# Patient Record
Sex: MALE | Race: ASIAN | Employment: FULL TIME | URBAN - METROPOLITAN AREA
[De-identification: names, ages, dates, MRNs, and addresses within clinical notes are randomized per-mention and may not be internally consistent; named-entity substitution may affect disease eponyms.]

---

## 2018-06-12 ENCOUNTER — OFFICE VISIT (OUTPATIENT)
Dept: INTERNAL MEDICINE | Facility: CLINIC | Age: 23
End: 2018-06-12
Payer: COMMERCIAL

## 2018-06-12 VITALS
BODY MASS INDEX: 24.79 KG/M2 | WEIGHT: 148.8 LBS | RESPIRATION RATE: 20 BRPM | OXYGEN SATURATION: 97 % | TEMPERATURE: 98 F | HEART RATE: 69 BPM | SYSTOLIC BLOOD PRESSURE: 138 MMHG | DIASTOLIC BLOOD PRESSURE: 95 MMHG | HEIGHT: 65 IN

## 2018-06-12 DIAGNOSIS — I10 HYPERTENSION, UNSPECIFIED TYPE: Primary | ICD-10-CM

## 2018-06-12 DIAGNOSIS — M54.50 ACUTE MIDLINE LOW BACK PAIN WITHOUT SCIATICA: ICD-10-CM

## 2018-06-12 ASSESSMENT — ENCOUNTER SYMPTOMS
TACHYCARDIA: 0
SKIN CHANGES: 0
HOARSE VOICE: 0
SYNCOPE: 0
NIGHT SWEATS: 0
MEMORY LOSS: 0
DYSURIA: 0
RESPIRATORY PAIN: 0
HYPOTENSION: 0
BACK PAIN: 1
ARTHRALGIAS: 0
ALTERED TEMPERATURE REGULATION: 0
TREMORS: 0
HEMOPTYSIS: 0
ABDOMINAL PAIN: 0
SORE THROAT: 0
PARALYSIS: 0
DECREASED CONCENTRATION: 0
NECK MASS: 0
DEPRESSION: 0
DIARRHEA: 0
SPEECH CHANGE: 0
SINUS PAIN: 0
LEG SWELLING: 0
DIFFICULTY URINATING: 0
TASTE DISTURBANCE: 0
BLOOD IN STOOL: 0
MUSCLE WEAKNESS: 0
HEMATURIA: 0
MUSCLE CRAMPS: 0
SINUS CONGESTION: 0
POLYDIPSIA: 0
RECTAL BLEEDING: 0
LOSS OF CONSCIOUSNESS: 0
INCREASED ENERGY: 0
WEAKNESS: 0
SHORTNESS OF BREATH: 0
SLEEP DISTURBANCES DUE TO BREATHING: 0
HYPERTENSION: 0
WEIGHT GAIN: 0
DECREASED APPETITE: 0
EYE REDNESS: 0
TROUBLE SWALLOWING: 0
DISTURBANCES IN COORDINATION: 0
SEIZURES: 0
ORTHOPNEA: 0
DOUBLE VISION: 0
BLOATING: 0
NAUSEA: 0
STIFFNESS: 0
SNORES LOUDLY: 0
POSTURAL DYSPNEA: 0
EXTREMITY NUMBNESS: 0
SPUTUM PRODUCTION: 0
NAIL CHANGES: 0
DYSPNEA ON EXERTION: 0
HEARTBURN: 0
SMELL DISTURBANCE: 0
FLANK PAIN: 0
BRUISES/BLEEDS EASILY: 0
COUGH: 0
SWOLLEN GLANDS: 0
WEIGHT LOSS: 0
HEADACHES: 0
INSOMNIA: 0
LEG PAIN: 0
PALPITATIONS: 0
NERVOUS/ANXIOUS: 0
DIZZINESS: 0
VOMITING: 0
CONSTIPATION: 0
CLAUDICATION: 0
COUGH DISTURBING SLEEP: 0
CHILLS: 0
RECTAL PAIN: 0
EXERCISE INTOLERANCE: 0
NUMBNESS: 0
TINGLING: 0
MYALGIAS: 0
NECK PAIN: 0
EYE WATERING: 0
POLYPHAGIA: 0
FATIGUE: 0
JAUNDICE: 0
WHEEZING: 0
HALLUCINATIONS: 0
EYE IRRITATION: 0
POOR WOUND HEALING: 0
EYE PAIN: 0
BOWEL INCONTINENCE: 0
FEVER: 0
LIGHT-HEADEDNESS: 0
JOINT SWELLING: 0
PANIC: 0

## 2018-06-12 ASSESSMENT — PAIN SCALES - GENERAL: PAINLEVEL: NO PAIN (0)

## 2018-06-12 NOTE — NURSING NOTE
Chief Complaint   Patient presents with     Establish Care     Patient is here to establish care for PCP     Back Pain     Also here for low back pain       Brandon Jules CMA ([facility]) at 9:46 AM on 6/12/2018

## 2018-06-12 NOTE — LETTER
[employer] Customer Service  [facility]  [address], [address]  [place] [address]  Fax: [phone]  Phone: [phone]      2018      Juan A Ro  [address]  [address] [place] [id number]        Dear Juan A,    Thank you for your interest in becoming a [employer] user!    Your access code is: [email or web address]  Expires: 2018 12:49 PM     Please access the [employer] website at [email or web address].  Below the ID and password fields, select the  Sign Up Now  as New User.  You will be prompted to enter the access code listed above as well as additional personal information.  Please follow the directions carefully when creating your username and password.    If you allow your access code to , or if you have any questions please call a [employer] Representative at [phone] during normal clinic hours.     Sincerely,      [employer] Customer Service  [facility]

## 2018-06-12 NOTE — PROGRESS NOTES
PRIMARY CARE CENTER         HPI:       Juan A Ro is a 22 year old male who presents for the following  Patient presents with: Establish Care (Patient is here to establish care for PCP) and Back Pain (Also here for low back pain)    Low back pain: Has had it for a couple of weeks. Thinks it may be due to weight lifting. He has not gotten a new bed. Recently got back into weight lifting a couple of months ago, and has been progressing pretty quickly.     Back only hurts in the morning or the middle of the night. And it dissipates once he gets up and walks around and stretches. It does linger for an hour. It causes him to wake up earlier and makes him more restless. His back hurts right in the middle. The pain has stayed the same, some days it hurts a bit more. Besides the stretching has not tried anything. Has not tried any over the counter pain meds. Uses a foam roller and stretch exercises sometimes in the morning and sometimes before weight lifting and sometimes after weight lifting. He has stopped squatting and deadlifting for a little over a week. The pain has stopped a little today but prior to this has been the same. He was deadlifting 250lb and was squatting about 185lb.     No fevers or chills, no numbness or tingling in legs or groin area, no problems with urinating or having bowel movemets, and he has not tripped more than usual. No recent trauma.    PMHx:  No chronic medical conditions.     PSHx:  No past sugeries    FamHx:  No family hx of autoimmune conditions. No fam hx of ankylosing spondylitis.     Allergies:  NKDA.    Meds:  No meds.     SocHx:  EtOH: none  Tob: none  Ellicit: none  He is from [place], he has been in [place] for four years. [employer] doing a masters.     Problem, Medication and Allergy Lists were reviewed and are current.  Patient is a new patient to this clinic and so  I reviewed/updated the Past Medical History, the Family History and the Social History.          Review  of Systems:   Review of Systems     Constitutional:  Negative for fever, chills, weight loss, weight gain, fatigue, decreased appetite, night sweats, recent stressors, height gain, height loss, post-operative complications, incisional pain, hallucinations, increased energy, hyperactivity and confused.   HENT:  Negative for ear pain, hearing loss, tinnitus, nosebleeds, trouble swallowing, hoarse voice, mouth sores, sore throat, ear discharge, tooth pain, gum tenderness, taste disturbance, smell disturbance, hearing aid, bleeding gums, dry mouth, sinus pain, sinus congestion and neck mass.    Eyes:  Negative for double vision, pain, redness, eye pain, decreased vision, eye watering, eye bulging, eye dryness, flashing lights, spots, floaters, strabismus, tunnel vision, jaundice and eye irritation.   Respiratory:   Negative for cough, hemoptysis, sputum production, shortness of breath, wheezing, sleep disturbances due to breathing, snores loudly, respiratory pain, dyspnea on exertion, cough disturbing sleep and postural dyspnea.    Cardiovascular:  Negative for chest pain, dyspnea on exertion, palpitations, orthopnea, claudication, leg swelling, fingers/toes turn blue, hypertension, hypotension, syncope, history of heart murmur, chest pain on exertion, chest pain at rest, pacemaker, few scattered varicosities, leg pain, sleep disturbances due to breathing, tachycardia, light-headedness, exercise intolerance and edema.   Gastrointestinal:  Negative for heartburn, nausea, vomiting, abdominal pain, diarrhea, constipation, blood in stool, melena, rectal pain, bloating, hemorrhoids, bowel incontinence, jaundice, rectal bleeding, coffee ground emesis and change in stool.   Genitourinary:  Negative for bladder incontinence, dysuria, urgency, hematuria, flank pain, difficulty urinating, nocturia, voiding less frequently, scrotal pain, ulcerations, penile discharge, male genitourinary complaint and reduced libido.  "  Musculoskeletal:  Positive for back pain. Negative for myalgias, joint swelling, arthralgias, stiffness, muscle cramps, neck pain, bone pain, muscle weakness and fracture.   Skin:  Negative for nail changes, itching, poor wound healing, rash, hair changes, skin changes, acne, warts, poor wound healing, scarring, flaky skin, Raynaud's phenomenon, sensitivity to sunlight and skin thickening.   Neurological:  Negative for dizziness, tingling, tremors, speech change, seizures, loss of consciousness, weakness, light-headedness, numbness, headaches, disturbances in coordination, extremity numbness, memory loss, difficulty walking and paralysis.   Endo/Heme:  Negative for anemia, swollen glands and bruises/bleeds easily.   Psychiatric/Behavioral:  Negative for depression, hallucinations, memory loss, decreased concentration, mood swings and panic attacks.    Endocrine:  Negative for altered temperature regulation, polyphagia, polydipsia, unwanted hair growth and change in facial hair.    I have personally reviewed and updated the complete ROS on the day of the visit.           Physical Exam:   BP (!) 138/95 (BP Location: Right arm, Patient Position: Chair, Cuff Size: Adult Regular)  Pulse 69  Temp 98  F (36.7  C) (Oral)  Resp 20  Ht 1.657 m (5' 5.25\")  Wt 67.5 kg (148 lb 12.8 oz)  SpO2 97%  BMI 24.57 kg/m2  Body mass index is 24.57 kg/(m^2).  Vitals were reviewed       GENERAL APPEARANCE: healthy, alert and no distress     EYES: EOMI,  PERRL     HENT: mouth without ulcers or lesions     RESP: lungs clear to auscultation - no rales, rhonchi or wheezes     CV: regular rates and rhythm, normal S1 S2, no S3 or S4 and no murmur, click or rub     ABDOMEN:  soft, nontender, no HSM or masses and bowel sounds normal     MS: extremities normal- no gross deformities noted, no evidence of inflammation in joints, FROM in all extremities.     SKIN: no suspicious lesions or rashes     NEURO: Normal strength and tone, sensory " exam grossly normal, mentation intact and speech normal     BACK: No spine tenderness or step-offs palpated, no paraspinal muscle tenderness; no pain on palpation of the posterior iliac spine      PSYCH: mentation appears normal. and affect normal/bright     LYMPHATICS: No cervical adenopathy        Results:   No recent lab results.     Assessment and Plan   Pt is a 23 y/o M w/a negative PMHx who presents to [facility] care and for acute onset low back pain.     Pt believes that back pain is due to weight lifting. He notices it mostly in the morning. Differential includes muscle strain (most likely), vs ankylosing spondylitis (less likely given negative family history, but if back pain/stiffness persist, we could consider this).     Diagnoses and all orders for this visit:    Hypertension, unspecified type  Pt had BP of 139 and has noted high blood pressures like this in the past. We will send him home with a blood pressure monitor. I also spoke to him about increasing physical activity (aerobics) and watching sodium intake in his diet. He does not take steroids. I asked him to return to care in three months for blood pressure check.   -     Blood Pressure Monitoring (BLOOD PRESSURE MONITOR/WRIST) BENY; 1 each once for 1 dose    Acute midline low back pain without sciatica  Likely 2/2 weight lifting. Referral for phys therapy for strengthening and stretching exercises.   -     PHYSICAL THERAPY REFERRAL      Options for treatment and follow-up care were reviewed with the patient. Juan A Ro engaged in the decision making process and verbalized understanding of the options discussed and agreed with the final plan.    Quynh Nicole MD  Jun 12, 2018    Pt was seen and plan of care discussed with Dr. Isbell.       Pt was seen and examined with Dr. Nicole.  I agree with her documentation as noted above.    My additional comments: none    Delmi Isbell MD

## 2018-06-12 NOTE — MR AVS SNAPSHOT
After Visit Summary   6/12/2018    Juan A Ro    MRN: [id number]           Patient Information     Date Of Birth          1995        Visit Information        Provider Department      6/12/2018 9:40 AM Quynh Nicole MD [facility]        Today's Diagnoses     Hypertension, unspecified type    -  1    Acute midline low back pain without sciatica          Care Instructions      Primary Care Center Medication Refill Request Information:  * Please contact your pharmacy regarding ANY request for medication refills.  ** [facility] Prescription Fax = [phone]  * Please allow 3 business days for routine medication refills.  * Please allow 5 business days for controlled substance medication refills.     Primary Care Center Test Result notification information:  *You will be notified with in 7-10 days of your appointment day regarding the results of your test.  If you are on MyChart you will be notified as soon as the provider has reviewed the results and signed off on them.    Primary Care Center [phone]   Physical Therapy for all [facility] locations: [phone]    Exercises to Strengthen Your Lower Back  Strong lower back and abdominal muscles work together to support your spine. The exercises below will help strengthen the lower back. It is important that you begin exercising slowly and increase levels gradually.  Always begin any exercise program with stretching. If you feel pain while doing any of these exercises, stop and talk to your doctor about a more specific exercise program that better suits your condition.   Low back stretch  The point of stretching is to make you more flexible and increase your range of motion. Stretch only as much as you are able. Stretch slowly. Do not push your stretch to the limit. If at any point you feel pain while stretching, this is your (temporary) limit.    Lie on your back with your knees bent and both feet on the ground.    Slowly  raise your left knee to your chest as you flatten your lower back against the floor. Hold for 5 seconds.    Relax and repeat the exercise with your right knee.    Do 10 of these exercises for each leg.    Repeat hugging both knees to your chest at the same time.  Building lower back strength  Start your exercise routine with 10 to 30 minutes a day, 1 to 3 times a day.  Initial exercises  Lying on your back:  1. Ankle pumps: Move your foot up and down, towards your head, and then away. Repeat 10 times with each foot.  2. Heel slides: Slowly bend your knee, drawing the heel of your foot towards you. Then slide your heel/foot from you, straightening your knee. Do not lift your foot off the floor (this is not a leg lift).  3. Abdominal contraction: Bend your knees and put your hands on your stomach. Tighten your stomach muscles. Hold for 5 seconds, then relax. Repeat 10 times.  4. Straight leg raise: Bend one leg at the knee and keep the other leg straight. Tighten your stomach muscles. Slowly lift your straight leg 6 to 12 inches off the floor and hold for up to 5 seconds. Repeat 10 times on each side.  Standin. Wall squats: Stand with your back against the wall. Move your feet about 12 inches away from the wall. Tighten your stomach muscles, and slowly bend your knees until they are at about a 45 degree angle. Do not go down too far. Hold about 5 seconds. Then slowly return to your starting position. Repeat 10 times.  2. Heel raises: Stand facing the wall. Slowly raise the heels of your feet up and down, while keeping your toes on the floor. If you have trouble balancing, you can touch the wall with your hands. Repeat 10 times.  More advanced exercises  When you feel comfortable enough, try these exercises.  1. Kneeling lumbar extension: Begin on your hands and knees. At the same time, raise and straighten your right arm and left leg until they are parallel to the ground. Hold for 2 seconds and come back slowly  to a starting position. Repeat with left arm and right leg, alternating 10 times.  2. Prone lumbar extension: Lie face down, arms extended overhead, palms on the floor. At the same time, raise your right arm and left leg as high as comfortably possible. Hold for 10 seconds and slowly return to start. Repeat with left arm and right leg, alternating 10 times. Gradually build up to 20 times. (Advanced: Repeat this exercise raising both arms and both legs a few inches off the floor at the same time. Hold for 5 seconds and release.)  3. Pelvic tilt: Lie on the floor on your back with your knees bent at 90 degrees. Your feet should be flat on the floor. Inhale, exhale, then slowly contract your abdominal muscles bringing your navel toward your spine. Let your pelvis rock back until your lower back is flat on the floor. Hold for 10 seconds while breathing smoothly.  4. Abdominal crunch: Perform a pelvic tilt (above) flattening your lower back against the floor. Holding the tension in your abdominal muscles, take another breath and raise your shoulder blades off the ground (this is not a full sit-up). Keep your head in line with your body (don t bend your neck forward). Hold for 2 seconds, then slowly lower.  Date Last Reviewed: 6/1/2016 2000-2017 [employer]. [address] [place] [address], [place] [address]. All rights reserved. This information is not intended as a substitute for professional medical care. Always follow your healthcare professional's instructions.               [facility]         Internal Medicine Resident                   Continuity Clinic    Who We Are    Resident Continuity Clinic is a part of the [facility] Primary Care [facility].  Resident physicians see patients independently and establish a relationship with them over the course of their three-year residency program.  As with the Primary Care Clinic, our Resident Continuity Clinic models a group practice.  If your doctor is not  available, you will be able to see another resident physician.  At the end of a resident s training, patients will be transitioned to a new resident physician for ongoing care.     We treat patients with a wide array of medical needs from routine physicals, to acute illnesses, to diabetes and blood pressure management, to complex medical illness.  What is a Resident Physician?    Resident physicians hold medical degrees and are doctors. They are training to become specialists in Internal Medicine. They work under the supervision of board-certified faculty physicians.  Expectations for Your Care    We strive to provide accessible, quality care at all times.    In order to provide this care, it is best to see your primary care resident doctor consistently rather switching between providers.  In the event you do see another physician, you should schedule a follow-up visit with your usual primary care doctor.    If you are transitioning your care from another clinic, it is helpful to have your records available for your doctor to review.    We do not prescribe controlled substances, such as ADD medications or narcotic pain medications, on your first visit.  We will review your health records and concerns prior to devising a treatment plan with you in order to provide the best care.      Clinic Services     Extended clinic hours; patient  to help navigate your visit;  parking; laboratory and imaging services with evening and weekend hours    Multiple medical and surgical specialties in one building    Complementary services, including Nutrition, Integrative Medicine, Pharmacy consultations, Mental and Behavioral Health, Sports Medicine and Physical Therapy    Thank You    We would like to thank you for choosing the [facility] Internal Medicine Resident Continuity Clinic for your primary care. You are making a priceless contribution to the training of the next generation of health care  practitioners.     Contact us at [phone] for appointments or questions.    Resident Clinic Hours are Tuesdays and Thursdays, 7:30am-5:00pm    Residents  Karen Alberto MD   (Female )   Dona Leyva MD   (Female)   Miguel Hollingsworth MD  (Male)   Pasquale Gracia MD  (Male)   Yoko Sutherland MD   (Female)   Jimmie Manriquez MD  (Male)    Christo Reis MD  (Male)   Rehan Tavera MD  (Male)   Pasquale Garvin MD (Male)   Gelacio Garcia MD  (Male)   Quynh Nicole MD (Female)    Breanna Maynard MD (Female)   Austen Croft MD  (Male)   Melanie Mckeon MD(Female)   Kesha Younger MD  (Female)    Supervising Physicians   MD Wendy Michelle, MD Carlos Melgar, MD Aris Wilson, MD Ranede Thibodeaux, MD Jamil Multani MD Heather Thompson Buum, MD Kathleen Watson, MD                    Follow-ups after your visit        Additional Services     PHYSICAL THERAPY REFERRAL       *This therapy referral will be filtered to a centralized scheduling office at [facility] and the patient will receive a call to schedule an appointment at a [place] location most convenient for them. *     [facility] provides Physical Therapy evaluation and treatment and many specialty services across the [place] system.  If requesting a specialty program, please choose from the list below.    If you have not heard from the scheduling office within 2 business days, please call [phone] for all locations, with the exception of [place], please call [phone] and [facility], please call [phone]  Treatment: Evaluation & Treatment  Special Instructions/Modalities: Back pain, Acute  Special Programs: None    Please be aware that coverage of these services is subject to the terms and limitations of your health insurance plan.  Call member services at your health plan with any benefit or coverage questions.      **Note to Provider:  If  "you are referring outside of [facility] for the therapy appointment, please list the name of the location in the \"special instructions\" above, print the referral and give to the patient to schedule the appointment.                  Follow-up notes from your care team     Return in about 3 months (around 2018) for BP Recheck.      Your next 10 appointments already scheduled     Sep 11, 2018  9:45 AM CDT   (Arrive by 9:30 AM)   Return Visit with Quynh Nicole MD   [facility] ([facility])    [address] [place] [id number] [phone]              Who to contact     Please call your clinic at [phone] to:    Ask questions about your health    Make or cancel appointments    Discuss your medicines    Learn about your test results    Speak to your doctor            Additional Information About Your Visit        MyChart Information     uberall is an electronic gateway that provides easy, online access to your medical records. With uberall, you can request a clinic appointment, read your test results, renew a prescription or communicate with your care team.     To sign up for [employer] visit the website at [email or web address]   You will be asked to enter the access code listed below, as well as some personal information. Please follow the directions to create your username and password.     Your access code is: [email or web address]  Expires: 2018 12:49 PM     Your access code will  in 90 days. If you need help or a new code, please contact your [facility] or call [phone] for assistance.        Care EveryWhere ID     This is your Care EveryWhere ID. This could be used by other organizations to access your [facility] medical records  [id number]        Your Vitals Were     Pulse Temperature Respirations Height Pulse Oximetry BMI (Body Mass Index)    69 98  F (36.7  C) (Oral) 20 1.657 m (5' " "5.25\") 97% 24.57 kg/m2       Blood Pressure from Last 3 Encounters:   06/12/18 (!) 138/95    Weight from Last 3 Encounters:   06/12/18 67.5 kg (148 lb 12.8 oz)              We Performed the Following     PHYSICAL THERAPY REFERRAL          Today's Medication Changes          These changes are accurate as of 6/12/18 10:17 AM.  If you have any questions, ask your nurse or doctor.               Start taking these medicines.        Dose/Directions    Blood Pressure Monitor/Wrist Telma   Used for:  Hypertension, unspecified type   Started by:  Quynh Nicole MD        Dose:  1 each   1 each once for 1 dose   Quantity:  1 each   Refills:  0            Where to get your medicines      Some of these will need a paper prescription and others can be bought over the counter.  Ask your nurse if you have questions.     Bring a paper prescription for each of these medications     Blood Pressure Monitor/Wrist Telma                Primary Care Provider    None Specified       No primary provider on file.        Equal Access to Services     [facility]: Kristofer Hagan, zulma miles, mariah marie, roro siegel . So [facility] [phone].    ATENCIÓN: Si habla español, tiene a cornejo disposición servicios gratuitos de asistencia lingüística. Llame al [phone].    We comply with applicable federal civil rights laws and [place] laws. We do not discriminate on the basis of race, color, national origin, age, disability, sex, sexual orientation, or gender identity.            Thank you!     Thank you for choosing [facility]  for your care. Our goal is always to provide you with excellent care. Hearing back from our patients is one way we can continue to improve our services. Please take a few minutes to complete the written survey that you may receive in the mail after your visit with us. Thank you!             Your Updated Medication List - Protect others " around you: Learn how to safely use, store and throw away your medicines at www.disposemymeds.org.          This list is accurate as of 6/12/18 10:17 AM.  Always use your most recent med list.                   Brand Name Dispense Instructions for use Diagnosis    Blood Pressure Monitor/Wrist Telma     1 each    1 each once for 1 dose    Hypertension, unspecified type

## 2018-06-12 NOTE — PATIENT INSTRUCTIONS
[facility] Medication Refill Request Information:  * Please contact your pharmacy regarding ANY request for medication refills.  ** [facility] Prescription Fax = [phone]  * Please allow 3 business days for routine medication refills.  * Please allow 5 business days for controlled substance medication refills.     [facility] Test Result notification information:  *You will be notified with in 7-10 days of your appointment day regarding the results of your test.  If you are on MyChart you will be notified as soon as the provider has reviewed the results and signed off on them.    [facility] [phone]   Physical Therapy for all [place] locations: [phone]    Exercises to Strengthen Your Lower Back  Strong lower back and abdominal muscles work together to support your spine. The exercises below will help strengthen the lower back. It is important that you begin exercising slowly and increase levels gradually.  Always begin any exercise program with stretching. If you feel pain while doing any of these exercises, stop and talk to your doctor about a more specific exercise program that better suits your condition.   Low back stretch  The point of stretching is to make you more flexible and increase your range of motion. Stretch only as much as you are able. Stretch slowly. Do not push your stretch to the limit. If at any point you feel pain while stretching, this is your (temporary) limit.    Lie on your back with your knees bent and both feet on the ground.    Slowly raise your left knee to your chest as you flatten your lower back against the floor. Hold for 5 seconds.    Relax and repeat the exercise with your right knee.    Do 10 of these exercises for each leg.    Repeat hugging both knees to your chest at the same time.  Building lower back strength  Start your exercise routine with 10 to 30 minutes a day, 1 to 3 times a day.  Initial exercises  Lying on your back:  1. Ankle pumps:  Move your foot up and down, towards your head, and then away. Repeat 10 times with each foot.  2. Heel slides: Slowly bend your knee, drawing the heel of your foot towards you. Then slide your heel/foot from you, straightening your knee. Do not lift your foot off the floor (this is not a leg lift).  3. Abdominal contraction: Bend your knees and put your hands on your stomach. Tighten your stomach muscles. Hold for 5 seconds, then relax. Repeat 10 times.  4. Straight leg raise: Bend one leg at the knee and keep the other leg straight. Tighten your stomach muscles. Slowly lift your straight leg 6 to 12 inches off the floor and hold for up to 5 seconds. Repeat 10 times on each side.  Standin. Wall squats: Stand with your back against the wall. Move your feet about 12 inches away from the wall. Tighten your stomach muscles, and slowly bend your knees until they are at about a 45 degree angle. Do not go down too far. Hold about 5 seconds. Then slowly return to your starting position. Repeat 10 times.  2. Heel raises: Stand facing the wall. Slowly raise the heels of your feet up and down, while keeping your toes on the floor. If you have trouble balancing, you can touch the wall with your hands. Repeat 10 times.  More advanced exercises  When you feel comfortable enough, try these exercises.  1. Kneeling lumbar extension: Begin on your hands and knees. At the same time, raise and straighten your right arm and left leg until they are parallel to the ground. Hold for 2 seconds and come back slowly to a starting position. Repeat with left arm and right leg, alternating 10 times.  2. Prone lumbar extension: Lie face down, arms extended overhead, palms on the floor. At the same time, raise your right arm and left leg as high as comfortably possible. Hold for 10 seconds and slowly return to start. Repeat with left arm and right leg, alternating 10 times. Gradually build up to 20 times. (Advanced: Repeat this exercise  raising both arms and both legs a few inches off the floor at the same time. Hold for 5 seconds and release.)  3. Pelvic tilt: Lie on the floor on your back with your knees bent at 90 degrees. Your feet should be flat on the floor. Inhale, exhale, then slowly contract your abdominal muscles bringing your navel toward your spine. Let your pelvis rock back until your lower back is flat on the floor. Hold for 10 seconds while breathing smoothly.  4. Abdominal crunch: Perform a pelvic tilt (above) flattening your lower back against the floor. Holding the tension in your abdominal muscles, take another breath and raise your shoulder blades off the ground (this is not a full sit-up). Keep your head in line with your body (don t bend your neck forward). Hold for 2 seconds, then slowly lower.  Date Last Reviewed: 6/1/2016 2000-2017 [employer]. [address] [place] [address]. All rights reserved. This information is not intended as a substitute for professional medical care. Always follow your healthcare professional's instructions.               [facility]         Internal Medicine Resident                   Continuity Clinic    Who We Are    Resident Continuity Clinic is a part of the [facility] Primary Care [facility].  Resident physicians see patients independently and establish a relationship with them over the course of their three-year residency program.  As with the Primary Care Clinic, our Resident Continuity Clinic models a group practice.  If your doctor is not available, you will be able to see another resident physician.  At the end of a resident s training, patients will be transitioned to a new resident physician for ongoing care.     We treat patients with a wide array of medical needs from routine physicals, to acute illnesses, to diabetes and blood pressure management, to complex medical illness.  What is a Resident Physician?    Resident physicians hold medical degrees  and are doctors. They are training to become specialists in Internal Medicine. They work under the supervision of board-certified faculty physicians.  Expectations for Your Care    We strive to provide accessible, quality care at all times.    In order to provide this care, it is best to see your primary care resident doctor consistently rather switching between providers.  In the event you do see another physician, you should schedule a follow-up visit with your usual primary care doctor.    If you are transitioning your care from another clinic, it is helpful to have your records available for your doctor to review.    We do not prescribe controlled substances, such as ADD medications or narcotic pain medications, on your first visit.  We will review your health records and concerns prior to devising a treatment plan with you in order to provide the best care.      Clinic Services     Extended clinic hours; patient  to help navigate your visit;  parking; laboratory and imaging services with evening and weekend hours    Multiple medical and surgical specialties in one building    Complementary services, including Nutrition, Integrative Medicine, Pharmacy consultations, Mental and Behavioral Health, Sports Medicine and Physical Therapy    Thank You    We would like to thank you for choosing the [facility] Internal Medicine Resident Continuity Clinic for your primary care. You are making a priceless contribution to the training of the next generation of health care practitioners.     Contact us at [phone] for appointments or questions.    Resident Clinic Hours are Tuesdays and Thursdays, 7:30am-5:00pm    Residents  Karen Alberto MD   (Female )   Dona Leyva MD   (Female)   Miguel Hollingsworth MD  (Male)   Pasquale Gracia MD  (Male)   Yoko Sutherland MD   (Female)   Jimmie Manriquez MD  (Male)    Christo Reis MD  (Male)   Rehan Tavera MD  (Male)   Pasquale Garvin MD (Male)   Gelacio  MD Radha  (Male)   Quynh Nicole MD (Female)    Breanna Maynard MD (Female)   Austen Croft MD  (Male)   Melanie Mckeon MD(Female)   Kesha Younger MD  (Female)    Supervising Physicians   Maribel Watson, MD Wendy Maurer, MD Carlos Melgar, MD Aris Wilson, MD Randee Thibodeaux, MD Ivelisse Rdz, MD Jamil Odom, MD Kristina Cox, MD Delmi Isbell MD

## 2018-07-02 ENCOUNTER — THERAPY VISIT (OUTPATIENT)
Dept: PHYSICAL THERAPY | Facility: CLINIC | Age: 23
End: 2018-07-02
Attending: INTERNAL MEDICINE
Payer: COMMERCIAL

## 2018-07-02 DIAGNOSIS — M54.50 MIDLINE LOW BACK PAIN WITHOUT SCIATICA: Primary | ICD-10-CM

## 2018-07-02 PROCEDURE — 97161 PT EVAL LOW COMPLEX 20 MIN: CPT | Mod: GP | Performed by: PHYSICAL THERAPIST

## 2018-07-02 PROCEDURE — 97112 NEUROMUSCULAR REEDUCATION: CPT | Mod: GP | Performed by: PHYSICAL THERAPIST

## 2018-07-02 NOTE — MR AVS SNAPSHOT
After Visit Summary   7/2/2018    Juan A Ro    MRN: [id number]           Patient Information     Date Of Birth          1995        Visit Information        Provider Department      7/2/2018 1:30 PM Raquel Lagos PT [facility]        Today's Diagnoses     Midline low back pain without sciatica    -  1       Follow-ups after your visit        Your next 10 appointments already scheduled     Jul 10, 2018  1:30 PM CDT   [facility] Spine with Lizzette Martinez PTA   [facility] ([facility])    [address] [place] [address] [id number] [phone]            Jul 24, 2018  4:10 PM CDT   [facility] Spine with Raquel Lagos PT   [facility] ([facility])    [address] [place] [address] [id number] [phone]            Sep 11, 2018  9:45 AM CDT   (Arrive by 9:30 AM)   Return Visit with Quynh Nicole MD   [facility] ([facility])    [address] [place] [address] [id number] [phone]              Who to contact     If you have questions or need follow up information about today's clinic visit or your schedule please contact [facility] PHYSICAL THERAPY [facility] directly at [phone].  Normal or non-critical lab and imaging results will be communicated to you by MyChart, letter or phone within 4 business days after the clinic has received the results. If you do not hear from us within 7 days, please contact the clinic through MyChart or phone. If you have a critical or abnormal lab result, we will notify you by phone as soon as possible.  Submit refill requests through [employer] or call your pharmacy and they will forward the refill request to us. Please allow 3 business days for your refill to be completed.          Additional Information About Your Visit        MyChart Information      [email or web address] lets you send messages to your doctor, view your test results, renew your prescriptions, schedule appointments and more. To sign up, go to [email or web address] . Click on \"Log in\" on the left side of the screen, which will take you to the Welcome page. Then click on \"Sign up Now\" on the right side of the page.     You will be asked to enter the access code listed below, as well as some personal information. Please follow the directions to create your username and password.     Your access code is: [email or web address]  Expires: 2018  1:56 PM     Your access code will  in 90 days. If you need help or a new code, please call your [place] clinic or [phone].        Care EveryWhere ID     This is your Care EveryWhere ID. This could be used by other organizations to access your [place] medical records  [id number]         Blood Pressure from Last 3 Encounters:   18 (!) 138/95    Weight from Last 3 Encounters:   18 67.5 kg (148 lb 12.8 oz)              We Performed the Following     HC PT EVAL, LOW COMPLEXITY     [facility] INITIAL EVAL REPORT     NEUROMUSCULAR RE-EDUCATION        Primary Care Provider    None Specified       No primary provider on file.        Equal Access to Services     MARILUZ MENDEZ : Hadii sheyla carloso Danya, waaxda luqadaha, qaybta kaalmada adeegyada, roro siegel . So [facility] [phone].    ATENCIÓN: Si habla español, tiene a conrejo disposición servicios gratuitos de asistencia lingüística. Llame al [phone].    We comply with applicable federal civil rights laws and [place] laws. We do not discriminate on the basis of race, color, national origin, age, disability, sex, sexual orientation, or gender identity.            Thank you!     Thank you for choosing [facility]YSICAL [facility]ERAP[facility]  for your care. Our goal is always to provide you with excellent care. Hearing back from our patients is one way we can continue to improve our services. " Please take a few minutes to complete the written survey that you may receive in the mail after your visit with us. Thank you!             Your Updated Medication List - Protect others around you: Learn how to safely use, store and throw away your medicines at www.disposemymeds.org.      Notice  As of 7/2/2018  2:09 PM    You have not been prescribed any medications.

## 2018-07-02 NOTE — PROGRESS NOTES
[facility] Initial Evaluation  Subjective:  [facility]  Physical Therapy Initial Examination/Evaluation  July 2, 2018    Juan A Ro is a 22 year old  male referred to physical therapy by Dr. Isbell for treatment of LBP with Precautions/Restrictions/MD instructions none      Subjective:  Referring MD visit date: 6/12/18  DOI/onset: 6 weeks ago  Mechanism of injury: Patient restarted weight lifting 4 months ago.  He felt a gradual onset of low back pain about six weeks ago.  Is primarily noticing pain in the morning when he wakes up, and also sometimes wakes with the pain.  He also has modified his weight lifting routine to take out squats and dead lifts.  DOS none  Previous treatment: none  Imaging: none  Chief Complaint:   Pain in middle of the night and when waking in the morning.  Can't do the weight lifting routine he desires.  Feels the pain mildly throughout the day.   Pain: recently in morning 6 /10, worst 8-9/10 central lumbar Described as: sharp, aching Alleviated by: getting out of bed Frequency: intermittent Progression of symptoms since initial onset: improving Time of day when pain is worse: in the AM  Sleeping: occasionally wakes with pain    Occupation:   Job duties:  Sit stand desk    Current HEP/exercise regimen: compound lifts  Patient's goals are decrease pain, get rid of morning pain    Pertinent PMH: unsure if he has hypertension  - is monitoring his BP at home with a unit  General Health Reported by Patient: Good  Return to MD:  PRN         Objective:  System         Lumbar/SI Evaluation  ROM:    AROM Lumbar:   Flexion:            100%  Ext:                    75% +   Side Bend:        Left:  100%    Right:  100%  Rotation:           Left:  100%    Right:  100%  Side Glide:        Left:     Right:         Strength: Gluteus medius: 5-/5 B; Moderate transverse abdominus and  internal oblique strength  Lumbar Myotomes:    T12-L3 (Hip Flex):  Left: 5    Right: 5  L2-4 (Quads):  Left:  5    Right:  5  L4 (Ankle DF):  Left:  5    Right:  5  L5 (Great Toe Ext): Left: 5    Right: 5   S1 (Toe Raise):  Left: 5    Right: 5        Neural Tension/Mobility:      Left side:SLR  negative.     Right side:   SLR  negative.   Lumbar Palpation:  normal      Functional Tests:  Core strength and proprioception lumbar: Mild functional valgus bilaterally with SL squat.        Lumbar Provocation:      Left negative with:  PROM hip    Right negative with:  PROM hip  Spinal Segmental Conclusions: No pain with PA spring testing in lumbar spine                                                       General     ROS    Assessment/Plan:    Patient is a 22 year old male with lumbar complaints.    Patient has the following significant findings with corresponding treatment plan.                Diagnosis 1:  Low back pain    Pain -  hot/cold therapy, US, electric stimulation, manual therapy, splint/taping/bracing/orthotics, self management, education and home program  Decreased ROM/flexibility - manual therapy and therapeutic exercise  Decreased strength - therapeutic exercise and therapeutic activities  Decreased proprioception - neuro re-education and therapeutic activities  Impaired muscle performance - neuro re-education  Decreased function - therapeutic activities    Therapy Evaluation Codes:   1) History comprised of:   Personal factors that impact the plan of care:      None.    Comorbidity factors that impact the plan of care are:      None.     Medications impacting care: None.  2) Examination of Body Systems comprised of:   Body structures and functions that impact the plan of care:      Lumbar spine.   Activity limitations that impact the plan of care are:      Sleeping and Laying down.  3) Clinical presentation characteristics are:   Stable/Uncomplicated.  4) Decision-Making    Low complexity using  standardized patient assessment instrument and/or measureable assessment of functional outcome.  Cumulative Therapy Evaluation is: Low complexity.    Previous and current functional limitations:  (See Goal Flow Sheet for this information)    Short term and Long term goals: (See Goal Flow Sheet for this information)     Communication ability:  Patient appears to be able to clearly communicate and understand verbal and written communication and follow directions correctly.  Treatment Explanation - The following has been discussed with the patient:   RX ordered/plan of care  Anticipated outcomes  Possible risks and side effects  This patient would benefit from PT intervention to resume normal activities.   Rehab potential is good.    Frequency:  1 X week, once daily  Duration:  for 6 weeks  Discharge Plan:  Achieve all LTG.  Independent in home treatment program.  Reach maximal therapeutic benefit.    Please refer to the daily flowsheet for treatment today, total treatment time and time spent performing 1:1 timed codes.

## 2018-07-10 ENCOUNTER — THERAPY VISIT (OUTPATIENT)
Dept: PHYSICAL THERAPY | Facility: CLINIC | Age: 23
End: 2018-07-10
Payer: COMMERCIAL

## 2018-07-10 DIAGNOSIS — M54.50 MIDLINE LOW BACK PAIN WITHOUT SCIATICA: ICD-10-CM

## 2018-07-10 PROCEDURE — 97530 THERAPEUTIC ACTIVITIES: CPT | Mod: GP | Performed by: PHYSICAL THERAPY ASSISTANT

## 2018-07-10 PROCEDURE — 97110 THERAPEUTIC EXERCISES: CPT | Mod: GP | Performed by: PHYSICAL THERAPY ASSISTANT

## 2018-09-11 ENCOUNTER — OFFICE VISIT (OUTPATIENT)
Dept: INTERNAL MEDICINE | Facility: CLINIC | Age: 23
End: 2018-09-11
Payer: COMMERCIAL

## 2018-09-11 VITALS
HEART RATE: 84 BPM | DIASTOLIC BLOOD PRESSURE: 87 MMHG | WEIGHT: 151.2 LBS | BODY MASS INDEX: 24.97 KG/M2 | SYSTOLIC BLOOD PRESSURE: 135 MMHG

## 2018-09-11 DIAGNOSIS — M54.6 CHRONIC BILATERAL THORACIC BACK PAIN: Primary | ICD-10-CM

## 2018-09-11 DIAGNOSIS — G89.29 CHRONIC BILATERAL THORACIC BACK PAIN: Primary | ICD-10-CM

## 2018-09-11 ASSESSMENT — PAIN SCALES - GENERAL: PAINLEVEL: NO PAIN (0)

## 2018-09-11 NOTE — PATIENT INSTRUCTIONS
[facility] Medication Refill Request Information:  * Please contact your pharmacy regarding ANY request for medication refills.  ** [facility] Prescription Fax = [phone]  * Please allow 3 business days for routine medication refills.  * Please allow 5 business days for controlled substance medication refills.     [facility] Test Result notification information:  *You will be notified with in 7-10 days of your appointment day regarding the results of your test.  If you are on MyChart you will be notified as soon as the provider has reviewed the results and signed off on them.    [facility]: [phone]              [facility]         Internal Medicine Resident                   Continuity Clinic    Who We Are    Resident Continuity Clinic is a part of the [facility] Primary Care Clinic.  Resident physicians see patients independently and establish a relationship with them over the course of their three-year residency program.  As with the Primary Care Clinic, our Resident Continuity Clinic models a group practice.  If your doctor is not available, you will be able to see another resident physician.  At the end of a resident s training, patients will be transitioned to a new resident physician for ongoing care.     We treat patients with a wide array of medical needs from routine physicals, to acute illnesses, to diabetes and blood pressure management, to complex medical illness.  What is a Resident Physician?    Resident physicians hold medical degrees and are doctors. They are training to become specialists in Internal Medicine. They work under the supervision of board-certified faculty physicians.  Expectations for Your Care    We strive to provide accessible, quality care at all times.    In order to provide this care, it is best to see your primary care resident doctor consistently rather switching between providers.  In the event you do see another physician, you should  schedule a follow-up visit with your usual primary care doctor.    If you are transitioning your care from another clinic, it is helpful to have your records available for your doctor to review.    We do not prescribe controlled substances, such as ADD medications or narcotic pain medications, on your first visit.  We will review your health records and concerns prior to devising a treatment plan with you in order to provide the best care.      Clinic Services     Extended clinic hours; patient  to help navigate your visit;  parking; laboratory and imaging services with evening and weekend hours    Multiple medical and surgical specialties in one building    Complementary services, including Nutrition, Integrative Medicine, Pharmacy consultations, Mental and Behavioral Health, Sports Medicine and Physical Therapy    Thank You    We would like to thank you for choosing the [facility] Internal Medicine Resident Continuity Clinic for your primary care. You are making a priceless contribution to the training of the next generation of health care practitioners.     Contact us at [phone] for appointments or questions.    Resident Clinic Hours are Tuesdays and Thursdays, 7:30am-5:00pm    Residents  Karen Alberto MD   (Female )   Dona Leyva MD   (Female)   Miguel Hollingsworth MD  (Male)   Pasquale Gracia MD  (Male)   Yoko Sutherland MD   (Female)   Jimmie Manriquez MD  (Male)    Christo Reis MD  (Male)   Rehan Tavera MD  (Male)   Pasquale Garvin MD (Male)   Gelacio Garcia MD  (Male)   Quynh Nicole MD (Female)    Breanna Maynard MD (Female)   Austen Croft MD  (Male)   Melanie Mckeon MD(Female)   Kesha Younger MD  (Female)    Supervising Physicians   MD Wendy Michelle MD Briar Duffy, MD James Langland, MD Mary Logeais, MD Tanya Melnik, MD Charles Moldow, MD Heather Thompson Buum, MD Kathleen Watson, MD

## 2018-09-11 NOTE — PROGRESS NOTES
PRIMARY CARE CENTER       SUBJECTIVE:  Juan A Ro is a 22 year old male with a PMHx of essential hypertension and chronic back pain who comes in for followup of his chronic back pain and HTN.       Pt states that he has been checking his BP at home but he is worried that it is not accurate. He says the highest has been around 132/86 and lowest, in mid-20s. He has not been taking it at consistent times of the day. He also notes that he thinks that aerobic exercise, a 10lb weight loss, and watching salt intake would help lower his BP. He does not want to try an antihypertensive at this time.     He also follows up for his chronic back pain in the thoracic area. Last time, it was noted that he lifts heavy weights. He has not decreased the amount of weight he lifts, but he saw physical therapy- who recommended contraction of abdominal muscles during lifting, and he has been trying to do this and it helped at first. He has decreased his weight lifting the last couple of weeks but he is still having it. At one point it had stopped hurting for a week but has since come back.  His lower back pain that causes him to wake up and he does not get as good of rest at night, that he thinks is also contributing to HTN. He says he has not tried aerobic exercise or yoga. He wants to try this and also try decreasing the amount of weight he lifts to 10-20 lb less. He also thinks weight loss will help.     He has not had fevers chills, loss of bowel/bladder function or saddle anesthesia.     Medications and allergies reviewed by me today. He is not taking any medications.     ROS:   14 pt ROS done and is negative unless otherwise stated in HPI    OBJECTIVE:    /87 (BP Location: Right arm, Patient Position: Sitting, Cuff Size: Adult Regular)  Pulse 84  Wt 68.6 kg (151 lb 3.2 oz)  BMI 24.97 kg/m2   Wt Readings from Last 1 Encounters:   09/11/18 68.6 kg (151 lb 3.2 oz)       GENERAL APPEARANCE: healthy, alert  and no distress     EYES: EOMI,  PERRL     HENT: MMM     NECK: FROM without pain      RESP: lungs clear to auscultation - no rales, rhonchi or wheezes     CV: regular rates and rhythm, normal S1 S2, no S3 or S4 and no murmur, click or rub     ABDOMEN:  Not obese     MS: extremities normal- no gross deformities noted, no evidence of inflammation in joints, FROM in all extremities.     SKIN: no suspicious lesions or rashes     BACK: no pain on palpation of the spine, no significant curvature noted, no pain on palpation of paraspinal muscles, no hypertrophy noted, no redness or swelling noted     NEURO: no focal deficits     PSYCH: mentation appears normal. and affect normal/bright         ASSESSMENT/PLAN:    Juan A was seen today for hypertension and chronic back pain.    Diagnoses and all orders for this visit:    #Chronic bilateral thoracic back pain  -     ORTHOPEDICS ADULT REFERRAL  - weight loss  -increase aerobic activity  -stretching  -decrease amount of weight when lifting    #HTN  -watch sodium intake  -agree with aerobic activity  -agree with mild 5-10lb weight loss       Pt should return to clinic for f/u with me in 3 months.     Quynh Nicole MD  Sep 11, 2018    Plan of care discussed with Dr. Watson.     Quynh Nicole MD PhD   Internal Medicine, PGY 2  p     Teaching Physician Note:    While the patient was in clinic, I reviewed the patient's medical history and results, the resident's findings on physical examination, and the patient's diagnosis and treatment plan with the resident.  I agree with the information documented with the following exceptions: none.    Maribel Watson M.D.  Internal Medicine  Primary Care Center   pager [phone]

## 2018-09-11 NOTE — NURSING NOTE
Chief Complaint   Patient presents with     Hypertension     f/u on BP     Jonah Grajeda EMT 9:47 AM on 9/11/2018.

## 2018-09-11 NOTE — MR AVS SNAPSHOT
After Visit Summary   9/11/2018    Juan A Ro    MRN: [id number]           Patient Information     Date Of Birth          1995        Visit Information        Provider Department      9/11/2018 9:45 AM Quynh Nicole MD [facility]        Today's Diagnoses     Chronic bilateral thoracic back pain    -  1      Care Instructions    Primary Care Center Medication Refill Request Information:  * Please contact your pharmacy regarding ANY request for medication refills.  ** [facility] Prescription Fax = [phone]  * Please allow 3 business days for routine medication refills.  * Please allow 5 business days for controlled substance medication refills.     Primary Care Center Test Result notification information:  *You will be notified with in 7-10 days of your appointment day regarding the results of your test.  If you are on MyChart you will be notified as soon as the provider has reviewed the results and signed off on them.    Primary Care Center: [phone]              [facility]         Internal Medicine Resident                   Continuity Clinic    Who We Are    Resident Continuity Clinic is a part of the [facility].  Resident physicians see patients independently and establish a relationship with them over the course of their three-year residency program.  As with the Primary Care Clinic, our Resident Continuity Clinic models a group practice.  If your doctor is not available, you will be able to see another resident physician.  At the end of a resident s training, patients will be transitioned to a new resident physician for ongoing care.     We treat patients with a wide array of medical needs from routine physicals, to acute illnesses, to diabetes and blood pressure management, to complex medical illness.  What is a Resident Physician?    Resident physicians hold medical degrees and are doctors. They are training to become specialists  in Internal Medicine. They work under the supervision of board-certified faculty physicians.  Expectations for Your Care    We strive to provide accessible, quality care at all times.    In order to provide this care, it is best to see your primary care resident doctor consistently rather switching between providers.  In the event you do see another physician, you should schedule a follow-up visit with your usual primary care doctor.    If you are transitioning your care from another clinic, it is helpful to have your records available for your doctor to review.    We do not prescribe controlled substances, such as ADD medications or narcotic pain medications, on your first visit.  We will review your health records and concerns prior to devising a treatment plan with you in order to provide the best care.      Clinic Services     Extended clinic hours; patient  to help navigate your visit;  parking; laboratory and imaging services with evening and weekend hours    Multiple medical and surgical specialties in one building    Complementary services, including Nutrition, Integrative Medicine, Pharmacy consultations, Mental and Behavioral Health, Sports Medicine and Physical Therapy    Thank You    We would like to thank you for choosing the [facility] Resident Continuity Clinic for your primary care. You are making a priceless contribution to the training of the next generation of health care practitioners.     Contact us at [phone] for appointments or questions.    Resident Clinic Hours are Tuesdays and Thursdays, 7:30am-5:00pm    Residents  Karen Alberto MD   (Female )   Dona Leyva MD   (Female)   Miguel Hollnigsworth MD  (Male)   Pasquale Gracia MD  (Male)   Yoko Sutherland MD   (Female)   Jimmie Manriquez MD  (Male)    Christo Reis MD  (Male)   Rehan Tavera MD  (Male)   Pasquale Garvin MD (Male)   Gelacio Garcia MD  (Male)   Quynh Nicole MD (Female)     Breanna Maynard MD (Female)   Austen Croft MD  (Male)   Melanie Mckeon MD(Female)   Kesha Younger MD  (Female)    Supervising Physicians   MD Wendy Michelle, MD Carlos Melgar, MD Randee Hensley MD Tanya Melnik, MD Jamil Odom, MD Kristina Cox, MD Delmi Isbell MD                    Follow-ups after your visit        Additional Services     ORTHOPEDICS ADULT REFERRAL       Your provider has referred you to: [facility]: Sports Medicine Clinic [facility] [phone]   [email or web address]    Please be aware that coverage of these services is subject to the terms and limitations of your health insurance plan.  Call member services at your health plan with any benefit or coverage questions.      Please bring the following to your appointment:    >>   Any x-rays, CTs or MRIs which have been performed.  Contact the facility where they were done to arrange for  prior to your scheduled appointment.    >>   List of current medications   >>   This referral request   >>   Any documents/labs given to you for this referral                  Follow-up notes from your care team     Return in about 3 months (around 12/11/2018).      Your next 10 appointments already scheduled     Sep 13, 2018  9:30 AM CDT   (Arrive by 9:15 AM)   New Patient Visit with Devonte Love MD   [facility] ([facility])    [address] [place] [address]  5th Floor  [facility] [address] [phone]            Dec 11, 2018  9:45 AM CST   (Arrive by 9:30 AM)   Return Visit with Quynh Nicole MD   [facility] ([facility])    [address] [place] [address]  4th Floor  [facility] [address] [phone]              Who to contact     Please call your clinic at [phone] to:    Ask questions about your health    Make or cancel  appointments    Discuss your medicines    Learn about your test results    Speak to your doctor            Additional Information About Your Visit        [employer] Information     [employer] gives you secure access to your electronic health record. If you see a primary care provider, you can also send messages to your care team and make appointments. If you have questions, please call your primary care clinic.  If you do not have a primary care provider, please call [phone] and they will assist you.      [employer] is an electronic gateway that provides easy, online access to your medical records. With [employer], you can request a clinic appointment, read your test results, renew a prescription or communicate with your care team.     To access your existing account, please contact your [facility] or call [phone] for assistance.        Care EveryWhere ID     This is your Care EveryWhere ID. This could be used by other organizations to access your [facility] medical records  [id number]        Your Vitals Were     Pulse BMI (Body Mass Index)                84 24.97 kg/m2           Blood Pressure from Last 3 Encounters:   09/11/18 135/87   06/12/18 (!) 138/95    Weight from Last 3 Encounters:   09/11/18 68.6 kg (151 lb 3.2 oz)   06/12/18 67.5 kg (148 lb 12.8 oz)              We Performed the Following     ORTHOPEDICS ADULT REFERRAL        Primary Care Provider Office Phone # Fax #    Quynh Ashanti Nicole [phone]       [place] [address]        Equal Access to Services     MARILUZ MENDEZ AH: Hadii sheyla Hagan, wagene miles, qaybta kaalmada frank, roro temple. So [facility] [phone].    ATENCIÓN: Si habla español, tiene a cornejo disposición servicios gratuitos de asistencia lingüística. Llame al [phone].    We comply with applicable federal civil rights laws and [place] laws. We do not  discriminate on the basis of race, color, national origin, age, disability, sex, sexual orientation, or gender identity.            Thank you!     Thank you for choosing [facility]  for your care. Our goal is always to provide you with excellent care. Hearing back from our patients is one way we can continue to improve our services. Please take a few minutes to complete the written survey that you may receive in the mail after your visit with us. Thank you!             Your Updated Medication List - Protect others around you: Learn how to safely use, store and throw away your medicines at www.disposemymeds.org.      Notice  As of 9/11/2018 10:42 AM    You have not been prescribed any medications.

## 2018-09-13 ENCOUNTER — RADIANT APPOINTMENT (OUTPATIENT)
Dept: GENERAL RADIOLOGY | Facility: CLINIC | Age: 23
End: 2018-09-13
Payer: COMMERCIAL

## 2018-09-13 ENCOUNTER — OFFICE VISIT (OUTPATIENT)
Dept: ORTHOPEDICS | Facility: CLINIC | Age: 23
End: 2018-09-13
Payer: COMMERCIAL

## 2018-09-13 ENCOUNTER — PRE VISIT (OUTPATIENT)
Dept: ORTHOPEDICS | Facility: CLINIC | Age: 23
End: 2018-09-13

## 2018-09-13 VITALS — RESPIRATION RATE: 16 BRPM | HEIGHT: 65 IN | WEIGHT: 151 LBS | BODY MASS INDEX: 25.16 KG/M2

## 2018-09-13 DIAGNOSIS — M54.6 THORACIC SPINE PAIN: Primary | ICD-10-CM

## 2018-09-13 DIAGNOSIS — M54.6 THORACIC SPINE PAIN: ICD-10-CM

## 2018-09-13 DIAGNOSIS — M54.6 PAIN IN THORACIC SPINE: Primary | ICD-10-CM

## 2018-09-13 NOTE — PROGRESS NOTES
" Subjective:   Juan A Ro is a 22 year old male who is c/o mid-back pain. He has tried a couple sessions of PT with little improvement.    Background:   Date of injury: None  Duration of symptoms: 2 months  Mechanism of Injury: Insidious Onset; Unknown   Intensity: Quick \"jolt\" of pain  Aggravating factors: Thoracic extension, jumping in sports, fast movements, mostly in morning and middle of the night  Relieving Factors: PT, foam roller, stretching in the morning  Prior Evaluation: Physical Therapy, PCP    Juan A is a 22-year-old male who comes in today and notes that he has had 3 months of mid to lower thoracic spine discomfort.  He does do some weight lifting and some aerobic training, and he is doing this because of a diagnosis of elevated blood pressure without hypertension and so is trying to manage this primarily with exercise and change in health behaviors.  He notes that he sleeps on his back.  He notes that most of his discomfort occurs at night.  His thoracic pain awakens him from sleep.  He denies any lower extremity symptoms or fasciculations.  He denies any chest or abdominal zonesthesia or paresthesias.  His family history is significant only for a mother with chronic kidney disease but otherwise no history of neoplasm, leukemia, lymphoma or spondyloarthropathy.  He denies lumbar or pain in the SI joint regions.  He does note that if he stretches and uses foam roller that this can help resolve his discomfort.  He states he does not have the discomfort every night but probably 4-5 nights out of the week with some repeated awakenings.  He has been to a couple of sessions of physical therapy with no significant benefit.  He denies the presence of any constitutional symptoms.  He is not having any fevers or chills.  He has had no weight loss.       PAST MEDICAL, SOCIAL, SURGICAL AND FAMILY HISTORY: He  has no past medical history on file.  He  has no past surgical history on file.  His family history is not " "on file.  He reports that he has never smoked. He has never used smokeless tobacco. He reports that he does not drink alcohol or use illicit drugs.    ALLERGIES: He has No Known Allergies.    CURRENT MEDICATIONS: He currently has no medications in their medication list.     REVIEW OF SYSTEMS: 12 point review of systems is negative except as noted above.     Exam:   Resp 16  Ht 5' 5.25\" (1.657 m)  Wt 151 lb (68.5 kg)  BMI 24.94 kg/m2      CONSTITUTIONAL: healthy, alert and no distress  SKIN: no suspicious lesions or rashes  GAIT: normal  NEUROLOGIC: Non-focal, Normal muscle tone and strength, reflexes normal, sensation grossly normal.  PSYCHIATRIC: affect normal/bright and mentation appears normal.    MUSCULOSKELETAL:   TLS SPINE:  He is nontender over the thoracic or lumbar spine.  Percussion over the thoracic and lumbar spine is benign and nonpainful.  He can forward flex touching his fingertips to the ground with knees fully extended, and he has no pain in the thoracic or lumbar spine.  Extension and extension/rotation to the right and left both produce mid to lower thoracic discomfort.  As he moves in these directions, he comes to a quick halt because of the discomfort.  He has no upper motor neuron signs.  The SI joints are nontender bilaterally.      ASSESSMENT/PLAN:  Juan A is 22-year-old [place]  male with mid to lower thoracic spine discomfort that is predominantly causing him difficulty at night and awakening him from sleep.  This has been going on for 3 months.  He has had no real benefit with physical therapy.  His exam does suggest that this is predominantly posterior element; however, I would expect this to be more bothersome that he were sleeping on his stomach and a solution would immediately be to have him transition to sleeping on his back.  Because he has already been sleeping on his back, I get more concerned about thoracic spinal stenosis or potential neoplasm or inflammatory " spondyloarthropathy.  To that end, we are going to proceed with a thoracic spine MRI.  He will return and follow up with me pending the MRI.  Certainly, if I have any unexpected findings on his MRI, I have notified him that we may need to extend his appointment so that may require a change so we can have a more in-depth discussion.  In the interim, I have not limited any of his activities.      Thank you for allowing me to participate in his care.

## 2018-09-13 NOTE — MR AVS SNAPSHOT
After Visit Summary   9/13/2018    Juan A Ro    MRN: [id number]           Patient Information     Date Of Birth          1995        Visit Information        Provider Department      9/13/2018 9:30 AM Devonte Love MD [facility]        Today's Diagnoses     Pain in thoracic spine    -  1       Follow-ups after your visit        Your next 10 appointments already scheduled     Sep 18, 2018  7:45 PM CDT   MR THORACIC SPINE W/O CONTRAST with [id number]   [facility] [place] [facility] ([facility])    [address] [address] [phone]           Take your medicines as usual, unless your doctor tells you not to. Bring a list of your current medicines to your exam (including vitamins, minerals and over-the-counter drugs). Also bring the results of similar scans you may have had.  Please remove any body piercings and hair extensions before you arrive.  Follow your doctor s orders. If you do not, we may have to postpone your exam.  You may or may not receive IV contrast for this exam pending the discretion of the Radiologist.  You do not need to do anything special to prepare.  The MRI machine uses a strong magnet. Please wear clothes without metal (snaps, zippers). A sweatsuit works well, or we may give you a hospital gown.   **IMPORTANT** THE INSTRUCTIONS BELOW ARE ONLY FOR THOSE PATIENTS WHO HAVE BEEN PRESCRIBED SEDATION OR GENERAL ANESTHESIA DURING THEIR MRI PROCEDURE:  IF YOUR DOCTOR PRESCRIBED ORAL SEDATION (take medicine to help you relax during your exam):   You must get the medicine from your doctor (oral medication) before you arrive. Bring the medicine to the exam. Do not take it at home. You ll be told when to take it upon arriving for your exam.   Arrive one hour early. Bring someone who can take you home after the test. Your medicine will make you sleepy. After the exam, you may not drive, take a bus or take a  taxi by yourself.  IF YOUR DOCTOR PRESCRIBED IV SEDATION:   Arrive one hour early. Bring someone who can take you home after the test. Your medicine will make you sleepy. After the exam, you may not drive, take a bus or take a taxi by yourself.   No eating 6 hours before your exam. You may have clear liquids up until 4 hours before your exam. (Clear liquids include water, clear tea, black coffee and fruit juice without pulp.)  IF YOUR DOCTOR PRESCRIBED ANESTHESIA (be asleep for your exam):   Arrive 1 1/2 hours early. Bring someone who can take you home after the test. You may not drive, take a bus or take a taxi by yourself.   No eating 8 hours before your exam. You may have clear liquids up until 4 hours before your exam. (Clear liquids include water, clear tea, black coffee and fruit juice without pulp.)   You will spend four to five hours in the recovery room.  Please call the Imaging Department at your exam site with any questions.            Sep 25, 2018  2:00 PM CDT   (Arrive by 1:45 PM)   Return Visit with Devonte Love MD   [facility] ([facility] [place])    [address]  [address] [facility] [address] [phone]            Dec 11, 2018  9:45 AM CST   (Arrive by 9:30 AM)   Return Visit with Quynh Nicole MD   [facility] ([facility])    [address]  [address] [facility] [id number] [phone]              Future tests that were ordered for you today     Open Future Orders        Priority Expected Expires Ordered    MR Thoracic Spine w/o Contrast Routine  9/13/2019 9/13/2018            Who to contact     Please call your clinic at [phone] to:    Ask questions about your health    Make or cancel appointments    Discuss your medicines    Learn about your test results    Speak to your doctor            Additional Information About Your Visit        MyChart Information     MyChart  "gives you secure access to your electronic health record. If you see a primary care provider, you can also send messages to your care team and make appointments. If you have questions, please call your primary care clinic.  If you do not have a primary care provider, please call [phone] and they will assist you.      [email or web address] is an electronic gateway that provides easy, online access to your medical records. With [email or web address], you can request a clinic appointment, read your test results, renew a prescription or communicate with your care team.     To access your existing account, please contact your [facility] or call [phone] for assistance.        Care EveryWhere ID     This is your Care EveryWhere ID. This could be used by other organizations to access your [place] medical records  [id number]        Your Vitals Were     Respirations Height BMI (Body Mass Index)             16 5' 5.25\" (1.657 m) 24.94 kg/m2          Blood Pressure from Last 3 Encounters:   09/11/18 135/87   06/12/18 (!) 138/95    Weight from Last 3 Encounters:   09/13/18 151 lb (68.5 kg)   09/11/18 151 lb 3.2 oz (68.6 kg)   06/12/18 148 lb 12.8 oz (67.5 kg)               Primary Care Provider Office Phone # Fax #    Quynh Ashanti Nicole [phone]       [place] [address]        Equal Access to Services     MARILUZ MENDEZ AH: Hadii aad ku hadasho Sofeliciaali, waaxda luqadaha, qaybta kaalmada adeegyada, roro schwartzn windy. So [facility] [phone].    ATENCIÓN: Si habla español, tiene a cornejo disposición servicios gratuitos de asistencia lingüística. Llame al [phone].    We comply with applicable federal civil rights laws and [place] laws. We do not discriminate on the basis of race, color, national origin, age, disability, sex, sexual orientation, or gender identity.            Thank you!     Thank you for choosing [facility]  " for your care. Our goal is always to provide you with excellent care. Hearing back from our patients is one way we can continue to improve our services. Please take a few minutes to complete the written survey that you may receive in the mail after your visit with us. Thank you!             Your Updated Medication List - Protect others around you: Learn how to safely use, store and throw away your medicines at www.disposemymeds.org.      Notice  As of 9/13/2018 10:08 AM    You have not been prescribed any medications.

## 2018-09-13 NOTE — LETTER
"  9/13/2018      RE: Juan A Ro  [address]  [facility] [address]        Subjective:   Juan A Ro is a 22 year old male who is c/o mid-back pain. He has tried a couple sessions of PT with little improvement.    Background:   Date of injury: None  Duration of symptoms: 2 months  Mechanism of Injury: Insidious Onset; Unknown   Intensity: Quick \"jolt\" of pain  Aggravating factors: Thoracic extension, jumping in sports, fast movements, mostly in morning and middle of the night  Relieving Factors: PT, foam roller, stretching in the morning  Prior Evaluation: Physical Therapy, PCP    Juan A is a 22-year-old male who comes in today and notes that he has had 3 months of mid to lower thoracic spine discomfort.  He does do some weight lifting and some aerobic training, and he is doing this because of a diagnosis of elevated blood pressure without hypertension and so is trying to manage this primarily with exercise and change in health behaviors.  He notes that he sleeps on his back.  He notes that most of his discomfort occurs at night.  His thoracic pain awakens him from sleep.  He denies any lower extremity symptoms or fasciculations.  He denies any chest or abdominal zonesthesia or paresthesias.  His family history is significant only for a mother with chronic kidney disease but otherwise no history of neoplasm, leukemia, lymphoma or spondyloarthropathy.  He denies lumbar or pain in the SI joint regions.  He does note that if he stretches and uses foam roller that this can help resolve his discomfort.  He states he does not have the discomfort every night but probably 4-5 nights out of the week with some repeated awakenings.  He has been to a couple of sessions of physical therapy with no significant benefit.  He denies the presence of any constitutional symptoms.  He is not having any fevers or chills.  He has had no weight loss.       PAST MEDICAL, SOCIAL, SURGICAL AND FAMILY HISTORY: He  has no past medical " "history on file.  He  has no past surgical history on file.  His family history is not on file.  He reports that he has never smoked. He has never used smokeless tobacco. He reports that he does not drink alcohol or use illicit drugs.    ALLERGIES: He has No Known Allergies.    CURRENT MEDICATIONS: He currently has no medications in their medication list.     REVIEW OF SYSTEMS: 12 point review of systems is negative except as noted above.     Exam:   Resp 16  Ht 5' 5.25\" (1.657 m)  Wt 151 lb (68.5 kg)  BMI 24.94 kg/m2      CONSTITUTIONAL: healthy, alert and no distress  SKIN: no suspicious lesions or rashes  GAIT: normal  NEUROLOGIC: Non-focal, Normal muscle tone and strength, reflexes normal, sensation grossly normal.  PSYCHIATRIC: affect normal/bright and mentation appears normal.    MUSCULOSKELETAL:   TLS SPINE:  He is nontender over the thoracic or lumbar spine.  Percussion over the thoracic and lumbar spine is benign and nonpainful.  He can forward flex touching his fingertips to the ground with knees fully extended, and he has no pain in the thoracic or lumbar spine.  Extension and extension/rotation to the right and left both produce mid to lower thoracic discomfort.  As he moves in these directions, he comes to a quick halt because of the discomfort.  He has no upper motor neuron signs.  The SI joints are nontender bilaterally.      ASSESSMENT/PLAN:  Juan A is 22-year-old [place]  male with mid to lower thoracic spine discomfort that is predominantly causing him difficulty at night and awakening him from sleep.  This has been going on for 3 months.  He has had no real benefit with physical therapy.  His exam does suggest that this is predominantly posterior element; however, I would expect this to be more bothersome that he were sleeping on his stomach and a solution would immediately be to have him transition to sleeping on his back.  Because he has already been sleeping on his back, I get more " concerned about thoracic spinal stenosis or potential neoplasm or inflammatory spondyloarthropathy.  To that end, we are going to proceed with a thoracic spine MRI.  He will return and follow up with me pending the MRI.  Certainly, if I have any unexpected findings on his MRI, I have notified him that we may need to extend his appointment so that may require a change so we can have a more in-depth discussion.  In the interim, I have not limited any of his activities.      Thank you for allowing me to participate in his care.           Devonte Love MD

## 2018-09-18 ENCOUNTER — RADIANT APPOINTMENT (OUTPATIENT)
Dept: MRI IMAGING | Facility: CLINIC | Age: 23
End: 2018-09-18
Attending: FAMILY MEDICINE
Payer: COMMERCIAL

## 2018-09-18 DIAGNOSIS — M54.6 PAIN IN THORACIC SPINE: ICD-10-CM

## 2018-09-25 ENCOUNTER — OFFICE VISIT (OUTPATIENT)
Dept: ORTHOPEDICS | Facility: CLINIC | Age: 23
End: 2018-09-25
Payer: COMMERCIAL

## 2018-09-25 DIAGNOSIS — M54.6 MIDLINE THORACIC BACK PAIN, UNSPECIFIED CHRONICITY: Primary | ICD-10-CM

## 2018-09-25 NOTE — LETTER
9/25/2018      RE: Juan A Ro  [address]  [facility] [address]        Subjective:   Juan A Ro is a 22 year old male who follows up with mid back pain to review MRI results.     We have reviewed his MRI of the thoracic spine.  There is no evidence of thoracic spinal stenosis, there is no evidence of spondyloarthropathy, there is no evidence of transverse myelitis or other spinal cord process.       His symptoms are most consistent with posterior element thoracic back discomfort, most likely related to limited motion within the lumbar spine.  At this juncture I am going to have him see physical therapy again.  He will see therapy and work on his lumbar mobilization as well as avoidance of midthoracic twisting or extension type maneuvers.  No specific followup is planned.       Fifteen minutes is spent with the patient and all of that time was spent in counselling and coordination of care.         Devonte Love MD

## 2018-09-25 NOTE — MR AVS SNAPSHOT
After Visit Summary   9/25/2018    Juan A Ro    MRN: [id number]           Patient Information     Date Of Birth          1995        Visit Information        Provider Department      9/25/2018 2:00 PM Devonte Love MD [facility] Sports Medicine        Today's Diagnoses     Midline thoracic back pain, unspecified chronicity    -  1       Follow-ups after your visit        Additional Services     PHYSICAL THERAPY REFERRAL (Internal)       Physical Therapy Referral                  Your next 10 appointments already scheduled     Sep 27, 2018  1:20 PM CDT   (Arrive by 1:05 PM)   [facility] Spine with Giacomo Cano PT   [facility] ([facility])    [address] [id number] [phone]            Dec 11, 2018  9:45 AM CST   (Arrive by 9:30 AM)   Return Visit with Quynh Nicole MD   [facility] ([facility])    [address]  [address] [facility] [address] [phone]              Future tests that were ordered for you today     Open Future Orders        Priority Expected Expires Ordered    PHYSICAL THERAPY REFERRAL (Internal) Routine  9/25/2019 9/25/2018            Who to contact     Please call your clinic at [phone] to:    Ask questions about your health    Make or cancel appointments    Discuss your medicines    Learn about your test results    Speak to your doctor            Additional Information About Your Visit        MyChart Information     [employer] gives you secure access to your electronic health record. If you see a primary care provider, you can also send messages to your care team and make appointments. If you have questions, please call your primary care clinic.  If you do not have a primary care provider, please call [phone] and they will assist you.      [email or web address] is an electronic gateway that provides easy, online access to your  medical records. With LegalZoom, you can request a clinic appointment, read your test results, renew a prescription or communicate with your care team.     To access your existing account, please contact your [facility] or call [phone] for assistance.        Care EveryWhere ID     This is your Care EveryWhere ID. This could be used by other organizations to access your [place] medical records  [id number]         Blood Pressure from Last 3 Encounters:   09/11/18 135/87   06/12/18 (!) 138/95    Weight from Last 3 Encounters:   09/13/18 151 lb (68.5 kg)   09/11/18 151 lb 3.2 oz (68.6 kg)   06/12/18 148 lb 12.8 oz (67.5 kg)               Primary Care Provider Office Phone # Fax #    Quynh Ashanti Nicole [phone]       [place] [address]        Equal Access to Services     MARILUZ MENDEZ AH: Hadii sheyla ku hadasho Soomaali, waaxda luqadaha, qaybta kaalmada adeegyada, roro monroe haymark siegel . So [facility] [phone].    ATENCIÓN: Si brila espeugene, tiene a cornejo disposición servicios gratuitos de asistencia lingüística. Llame al [phone].    We comply with applicable federal civil rights laws and [place] laws. We do not discriminate on the basis of race, color, national origin, age, disability, sex, sexual orientation, or gender identity.            Thank you!     Thank you for choosing [facility]  for your care. Our goal is always to provide you with excellent care. Hearing back from our patients is one way we can continue to improve our services. Please take a few minutes to complete the written survey that you may receive in the mail after your visit with us. Thank you!             Your Updated Medication List - Protect others around you: Learn how to safely use, store and throw away your medicines at www.disposemymeds.org.      Notice  As of 9/25/2018  5:32 PM    You have not been prescribed any  medications.

## 2018-09-25 NOTE — PROGRESS NOTES
Subjective:   Juan A Ro is a 22 year old male who follows up with mid back pain to review MRI results.     We have reviewed his MRI of the thoracic spine.  There is no evidence of thoracic spinal stenosis, there is no evidence of spondyloarthropathy, there is no evidence of transverse myelitis or other spinal cord process.       His symptoms are most consistent with posterior element thoracic back discomfort, most likely related to limited motion within the lumbar spine.  At this juncture I am going to have him see physical therapy again.  He will see therapy and work on his lumbar mobilization as well as avoidance of midthoracic twisting or extension type maneuvers.  No specific followup is planned.       Fifteen minutes is spent with the patient and all of that time was spent in counselling and coordination of care.

## 2018-09-27 ENCOUNTER — THERAPY VISIT (OUTPATIENT)
Dept: PHYSICAL THERAPY | Facility: CLINIC | Age: 23
End: 2018-09-27
Attending: FAMILY MEDICINE
Payer: COMMERCIAL

## 2018-09-27 DIAGNOSIS — M54.6 MIDLINE THORACIC BACK PAIN, UNSPECIFIED CHRONICITY: ICD-10-CM

## 2018-09-27 DIAGNOSIS — M54.50 MIDLINE LOW BACK PAIN WITHOUT SCIATICA: ICD-10-CM

## 2018-09-27 PROCEDURE — 97110 THERAPEUTIC EXERCISES: CPT | Mod: GP | Performed by: PHYSICAL THERAPIST

## 2018-09-27 PROCEDURE — 97530 THERAPEUTIC ACTIVITIES: CPT | Mod: GP | Performed by: PHYSICAL THERAPIST

## 2018-11-21 ENCOUNTER — OFFICE VISIT (OUTPATIENT)
Dept: OPHTHALMOLOGY | Facility: CLINIC | Age: 23
End: 2018-11-21
Payer: COMMERCIAL

## 2018-11-21 DIAGNOSIS — H52.13 MYOPIA OF BOTH EYES: Primary | ICD-10-CM

## 2018-11-21 ASSESSMENT — VISUAL ACUITY
OD_SC+: -3
OS_SC: 20/40
METHOD: SNELLEN - LINEAR
OD_SC: 20/25

## 2018-11-21 ASSESSMENT — CONF VISUAL FIELD
OD_NORMAL: 1
OS_NORMAL: 1
METHOD: COUNTING FINGERS

## 2018-11-21 ASSESSMENT — CUP TO DISC RATIO
OD_RATIO: 0.2
OS_RATIO: 0.2

## 2018-11-21 ASSESSMENT — REFRACTION_MANIFEST
OD_CYLINDER: SPHERE
OD_SPHERE: -0.50
OS_AXIS: 072
OS_CYLINDER: +0.50
OS_SPHERE: -1.00

## 2018-11-21 ASSESSMENT — EXTERNAL EXAM - LEFT EYE: OS_EXAM: NORMAL

## 2018-11-21 ASSESSMENT — TONOMETRY
OD_IOP_MMHG: 17
IOP_METHOD: ICARE
OS_IOP_MMHG: 18

## 2018-11-21 ASSESSMENT — SLIT LAMP EXAM - LIDS
COMMENTS: NORMAL
COMMENTS: NORMAL

## 2018-11-21 ASSESSMENT — EXTERNAL EXAM - RIGHT EYE: OD_EXAM: NORMAL

## 2018-11-21 NOTE — NURSING NOTE
Chief Complaints and History of Present Illnesses   Patient presents with     COMPREHENSIVE EYE EXAM     HPI    Affected eye(s):  Both   Symptoms:     Blurred vision      Frequency:  Constant       Do you have eye pain now?:  No      Comments:  Pt does not currently wear gls, but has noticed more blurry vision in the last couple months. No pain. No drops.  First eye exam.    Alen Clemons COT 1:43 PM November 21, 2018

## 2018-11-21 NOTE — PROGRESS NOTES
Assessment/Plan  (H52.13) Myopia of both eyes  (primary encounter diagnosis)  Plan: REFRACTION [28908]        SRx updated and released. Some adaptation to lenses should be expected. Plan on monitoring regularly for changes. RTC with vision changes- especially new flashes/floaters/curtain over vision.       Complete documentation of historical and exam elements from today's encounter can  be found in the full encounter summary report (not reduplicated in this progress  note). I personally obtained the chief complaint(s) and history of present illness. I  confirmed and edited as necessary the review of systems, past medical/surgical  history, family history, social history, and examination findings as documented by  others; and I examined the patient myself. I personally reviewed the relevant tests,  images, and reports as documented above. I formulated and edited as necessary the  assessment and plan and discussed the findings and management plan with the  patient and family.    Topher Jaramillo, OD

## 2018-11-21 NOTE — MR AVS SNAPSHOT
After Visit Summary   11/21/2018    Juan A Ro    MRN: [id number]           Patient Information     Date Of Birth          1995        Visit Information        Provider Department      11/21/2018 1:40 PM Topher Jaramillo OD [facility] Ophthalmology        Today's Diagnoses     Myopia of both eyes    -  1       Follow-ups after your visit        Your next 10 appointments already scheduled     Dec 11, 2018  9:45 AM CST   (Arrive by 9:30 AM)   Return Visit with MD MARYANNE [facility] ([facility])    [address] [place] [address] [phone]              Who to contact     Please call your clinic at [phone] to:    Ask questions about your health    Make or cancel appointments    Discuss your medicines    Learn about your test results    Speak to your doctor            Additional Information About Your Visit        MyChart Information     LendingStandardt gives you secure access to your electronic health record. If you see a primary care provider, you can also send messages to your care team and make appointments. If you have questions, please call your primary care clinic.  If you do not have a primary care provider, please call [phone] and they will assist you.      Locket is an electronic gateway that provides easy, online access to your medical records. With Locket, you can request a clinic appointment, read your test results, renew a prescription or communicate with your care team.     To access your existing account, please contact your [facility] or call [phone] for assistance.        Care EveryWhere ID     This is your Care EveryWhere ID. This could be used by other organizations to access your [place] medical records  [id number]         Blood Pressure from Last 3 Encounters:   09/11/18 135/87   06/12/18 (!) 138/95    Weight from Last 3 Encounters:   09/13/18  68.5 kg (151 lb)   09/11/18 68.6 kg (151 lb 3.2 oz)   06/12/18 67.5 kg (148 lb 12.8 oz)              We Performed the Following     REFRACTION [96618]        Primary Care Provider Office Phone # Fax #    Quynh Ashanti Nicole [phone]       [place] [address]        Equal Access to Services     MARILUZ MENDEZ : Hadii aad ku hadasho Soomaali, waaxda luqadaha, qaybta kaalmada adeegyada, waxay idiin hayaan adeeg luciano lavicky temple. So [facility] [phone].    ATENCIÓN: Si amos alcala, tiene a cornejo disposición servicios gratuitos de asistencia lingüística. Llame al [phone].    We comply with applicable federal civil rights laws and [place] laws. We do not discriminate on the basis of race, color, national origin, age, disability, sex, sexual orientation, or gender identity.            Thank you!     Thank you for choosing [facility] OPHTHALMOLOGY  for your care. Our goal is always to provide you with excellent care. Hearing back from our patients is one way we can continue to improve our services. Please take a few minutes to complete the written survey that you may receive in the mail after your visit with us. Thank you!             Your Updated Medication List - Protect others around you: Learn how to safely use, store and throw away your medicines at www.disposemymeds.org.      Notice  As of 11/21/2018  2:16 PM    You have not been prescribed any medications.

## 2018-12-11 ENCOUNTER — OFFICE VISIT (OUTPATIENT)
Dept: INTERNAL MEDICINE | Facility: CLINIC | Age: 23
End: 2018-12-11
Payer: COMMERCIAL

## 2018-12-11 VITALS
WEIGHT: 144.5 LBS | OXYGEN SATURATION: 95 % | BODY MASS INDEX: 23.86 KG/M2 | SYSTOLIC BLOOD PRESSURE: 118 MMHG | DIASTOLIC BLOOD PRESSURE: 76 MMHG | HEART RATE: 95 BPM

## 2018-12-11 DIAGNOSIS — M54.9 BACK PAIN, UNSPECIFIED BACK LOCATION, UNSPECIFIED BACK PAIN LATERALITY, UNSPECIFIED CHRONICITY: Primary | ICD-10-CM

## 2018-12-11 NOTE — PATIENT INSTRUCTIONS
Primary Care Center Phone Number: [phone]   Primary Care Center Medication Refill Request Information:  * Please contact your pharmacy regarding ANY request for medication refills.  ** [facility] Prescription Fax = [phone]  * Please allow 3 business days for routine medication refills.  * Please allow 5 business days for controlled substance medication refills.     Primary Care Center Test Result notification information:  *You will be notified with in 7-10 days of your appointment day regarding the results of your test.  If you are on MyChart you will be notified as soon as the provider has reviewed the results and signed off on them.             [facility]         Internal Medicine Resident                   Continuity Clinic    Who We Are    Resident Continuity Clinic is a part of the [facility] Primary [facility].  Resident physicians see patients independently and establish a relationship with them over the course of their three-year residency program.  As with the Primary Care Clinic, our Resident Continuity Clinic models a group practice.  If your doctor is not available, you will be able to see another resident physician.  At the end of a resident s training, patients will be transitioned to a new resident physician for ongoing care.     We treat patients with a wide array of medical needs from routine physicals, to acute illnesses, to diabetes and blood pressure management, to complex medical illness.  What is a Resident Physician?    Resident physicians hold medical degrees and are doctors. They are training to become specialists in Internal Medicine. They work under the supervision of board-certified faculty physicians.  Expectations for Your Care    We strive to provide accessible, quality care at all times.    In order to provide this care, it is best to see your primary care resident doctor consistently rather switching between providers.  In the event you do see another physician, you  should schedule a follow-up visit with your usual primary care doctor.    If you are transitioning your care from another clinic, it is helpful to have your records available for your doctor to review.    We do not prescribe controlled substances, such as ADD medications or narcotic pain medications, on your first visit.  We will review your health records and concerns prior to devising a treatment plan with you in order to provide the best care.      Clinic Services     Extended clinic hours; patient  to help navigate your visit;  parking; laboratory and imaging services with evening and weekend hours    Multiple medical and surgical specialties in one building    Complementary services, including Nutrition, Integrative Medicine, Pharmacy consultations, Mental and Behavioral Health, Sports Medicine and Physical Therapy    Thank You    We would like to thank you for choosing the [facility] Internal Medicine Resident Continuity Clinic for your primary care. You are making a priceless contribution to the training of the next generation of health care practitioners.     Contact us at [phone] for appointments or questions.    Resident Clinic Hours are Tuesdays and Thursdays, 7:30am-5:00pm    Residents   Ken Oakes MD   (Male)   Evon Preciado MD  (Female)  Karen Alberto MD   (Female)   Dona Leyva MD   (Female)   Miguel Hollingsworth MD  (Male)   Pasquale Gracia MD  (Male)   Yoko Sutherland MD    (Female)   Sudhakar Jefferson MD  (Male)   Jimmie Manriquez MD  (Male)    Rissa Smith MD  (Female)   Christo Reis MD  (Male)   Tayler Jeffrey MD  (Female)   Mirella Jones MD    (Female)   Rehan Tavera MD  (Male)   Sam Bentley MD  (Male)   Pasquale Garvin MD (Male)   Gelacio Garcia MD  (Male)   Quynh Nicole MD (Female)    Breanna Maynard MD (Female)   Austen Croft MD  (Male)   Melanie Mckeon MD    (Female)   Kesha Younger MD  (Female)    Supervising  Physicians   MD Wendy Michelle, MD Carlos Melgar, MD Aris Wilson, MD Randee Thibodeaux, MD Ivelisse Rdz, MD Jamil Odom, MD Delmi Nguyen MD

## 2018-12-11 NOTE — NURSING NOTE
Chief Complaint   Patient presents with     Recheck Medication     Pt is here for a 3 month follow up.       Ivelisse Portillo, Clinic EMT at 10:41 AM on 12/11/2018

## 2018-12-11 NOTE — PROGRESS NOTES
PRIMARY CARE CENTER       SUBJECTIVE:  Juan A Ro is a 22 year old male without a PMHx (no chronic medical conditions) who comes in for a followup appt of chronic low back pain and high blood pressure.    Pt states he has reduced sodium in his diet, is eating a healthy diet, has recently lost 9 lb. He has been checking BP at home and it has been normal (systolic 120s).    Regarding his low back pain, it is much improved. He saw orthopedics and PT. MRI of spine did not show abnormality and orthopedics thought that back pain could be 2/2 mechanical strain from weight lifting. PT recommended ab exercises. Pt has done these exercises, increased aerobic activity, lost weight, and decreased amount of wt he lifts during power lifting. This has improved his back pain.     Medications and allergies reviewed by me today.     ROS:   14 pt ROS completed and negative unless otherwise listed in HPI.     OBJECTIVE:  /76   Pulse 95   Wt 65.5 kg (144 lb 8 oz)   SpO2 95%   BMI 23.86 kg/m     Wt Readings from Last 1 Encounters:   12/11/18 65.5 kg (144 lb 8 oz)     General: AAOx3, NAD,   HEENT: NC/AT, MMM, oropharynx clear, anicteric sclera, conjunctiva normal  CV: RRR  Resp: Non-labored respirations  Extremities: wwp,  no pedal edema  Skin: No obvious rashes or lesions  Neuro: A/Ox3  Psych: Appropriate mood  Back: no pain on palpation    ASSESSMENT/PLAN:    #Back pain, improved  #HTN, improved  Pt was instructed to cont ab exercises and aerobic exercise as well as monitoring BP at home.  He will f/u as needed for acute concerns.     #Healthcare Maintenance  Declined flu vaccine and STI testing at this time    Pt should return to clinic for f/u with me in 1 year.   Quynh Nicole MD  Dec 11, 2018    Plan of care discussed with Dr. Isbell.     Quynh Nicole MD PhD   Internal Medicine, PGY 2  p     While the patient was in clinic, I reviewed the pertinent medical history and results.   I discussed the current findings on physical examination, as well as the patient s diagnosis and treatment plan with the resident and agree with the information as documented with the following exceptions: none.    Delmi Isbell MD

## 2019-07-31 PROBLEM — M54.50 MIDLINE LOW BACK PAIN WITHOUT SCIATICA: Status: RESOLVED | Noted: 2018-07-02 | Resolved: 2019-07-31

## 2020-03-11 ENCOUNTER — HEALTH MAINTENANCE LETTER (OUTPATIENT)
Age: 25
End: 2020-03-11

## 2021-01-03 ENCOUNTER — HEALTH MAINTENANCE LETTER (OUTPATIENT)
Age: 26
End: 2021-01-03

## 2021-04-15 ENCOUNTER — IMMUNIZATION (OUTPATIENT)
Dept: NURSING | Facility: CLINIC | Age: 26
End: 2021-04-15
Payer: COMMERCIAL

## 2021-04-15 PROCEDURE — 0001A PR COVID VAC PFIZER DIL RECON 30 MCG/0.3 ML IM: CPT

## 2021-04-15 PROCEDURE — 91300 PR COVID VAC PFIZER DIL RECON 30 MCG/0.3 ML IM: CPT

## 2021-04-25 ENCOUNTER — HEALTH MAINTENANCE LETTER (OUTPATIENT)
Age: 26
End: 2021-04-25

## 2021-05-06 ENCOUNTER — IMMUNIZATION (OUTPATIENT)
Dept: NURSING | Facility: CLINIC | Age: 26
End: 2021-05-06
Attending: INTERNAL MEDICINE
Payer: COMMERCIAL

## 2021-05-06 PROCEDURE — 0002A PR COVID VAC PFIZER DIL RECON 30 MCG/0.3 ML IM: CPT

## 2021-05-06 PROCEDURE — 91300 PR COVID VAC PFIZER DIL RECON 30 MCG/0.3 ML IM: CPT

## 2021-10-10 ENCOUNTER — HEALTH MAINTENANCE LETTER (OUTPATIENT)
Age: 26
End: 2021-10-10

## 2021-12-24 ENCOUNTER — IMMUNIZATION (OUTPATIENT)
Dept: NURSING | Facility: CLINIC | Age: 26
End: 2021-12-24
Payer: COMMERCIAL

## 2021-12-24 PROCEDURE — 90471 IMMUNIZATION ADMIN: CPT

## 2021-12-24 PROCEDURE — 90686 IIV4 VACC NO PRSV 0.5 ML IM: CPT

## 2021-12-24 PROCEDURE — 91300 PR COVID VAC PFIZER DIL RECON 30 MCG/0.3 ML IM: CPT

## 2021-12-24 PROCEDURE — 0004A PR COVID VAC PFIZER DIL RECON 30 MCG/0.3 ML IM: CPT

## 2022-05-21 ENCOUNTER — HEALTH MAINTENANCE LETTER (OUTPATIENT)
Age: 27
End: 2022-05-21

## 2022-09-18 ENCOUNTER — HEALTH MAINTENANCE LETTER (OUTPATIENT)
Age: 27
End: 2022-09-18

## 2023-06-04 ENCOUNTER — HEALTH MAINTENANCE LETTER (OUTPATIENT)
Age: 28
End: 2023-06-04

## 2024-07-14 ENCOUNTER — HEALTH MAINTENANCE LETTER (OUTPATIENT)
Age: 29
End: 2024-07-14
